# Patient Record
Sex: MALE | Race: WHITE
[De-identification: names, ages, dates, MRNs, and addresses within clinical notes are randomized per-mention and may not be internally consistent; named-entity substitution may affect disease eponyms.]

---

## 2020-12-23 NOTE — HISTORY AND PHYSICAL
DATE OF SERVICE:  



ADMISSION HISTORY AND PHYSICAL



This will be for outpatient surgery on 12/30/2020 for right shoulder

arthroscopic biceps tenodesis.



HISTORY OF PRESENT ILLNESS:

The patient is a 45-year-old right hand dominant gentleman, who previously

underwent a right shoulder SLAP repair, had been doing well until the summer. 

Began to experience right shoulder pain, worse with overhead activities and

worse with rolling.  It has progressed to the point where he is unable to

elevate his arm without assistance.  He underwent an MRI that showed a large

SLAP tear.  He denies neck pain, denies paresthesias.  Due to functional

impairment and failure to improve with extensive conservative measures, the

patient elected to proceed with surgical intervention.



REVIEW OF SYSTEMS:

No chest pain, no shortness of breath, no dysuria.



PAST MEDICAL HISTORY:

Unremarkable.



PAST SURGICAL HISTORY:

Right shoulder x2, cholecystectomy, tonsillectomy, EGD.



FAMILY HISTORY:

Significant for hyperlipidemia, diabetes, ischemic heart disease.



PRIMARY CARE PROVIDER:

Nurse practitioner, ____ Jordan.



MEDICATIONS:

Aspirin.



ALLERGIES:

No known drug allergies.



SOCIAL HISTORY:

The patient denies alcohol, tobacco use.



PHYSICAL EXAMINATION:

GENERAL:  The patient is well-developed, well-nourished, in no acute distress.

HEENT:  Normocephalic, atraumatic.  Pupils are equal, round, reactive to light. 

Oropharynx is clear.

NECK:  Supple, no lymphadenopathy.

LUNGS:  Clear to auscultation bilaterally.

HEART:  Regular rate and rhythm.

ABDOMEN:  Soft, nontender, nondistended.

EXTREMITIES:  Right shoulder demonstrates positive [place]. James's maneuver pain with

apprehension relieved with relocation.  Negative Spurling's.



Review of symmetric forward elevation, external and internal rotation.  Mildly

positive Neer's and King sign.  He has crepitus with glenohumeral rotation.



IMPRESSION:

Right shoulder SLAP tear.



PLAN:

Right shoulder arthroscopic-assisted biceps tenodesis.  We discussed risks,

benefits, options, ramifications and recovery.  He understands and wishes to

proceed.





Job ID: [id number]

DocumentID: [id number]

Dictated Date:  12/21/2020 15:21:45

Transcription Date: 12/21/2020 16:23:26

Dictated By: BRETT DAVALOS MD

## 2020-12-28 ENCOUNTER — HOSPITAL ENCOUNTER (OUTPATIENT)
Dept: HOSPITAL 75 - PREOP | Age: 45
Discharge: HOME | End: 2020-12-28
Attending: ORTHOPAEDIC SURGERY
Payer: COMMERCIAL

## 2020-12-28 VITALS — HEIGHT: 73.62 IN | WEIGHT: 210.32 LBS | BODY MASS INDEX: 27.28 KG/M2

## 2020-12-28 DIAGNOSIS — Z20.828: ICD-10-CM

## 2020-12-28 DIAGNOSIS — S43.431A: ICD-10-CM

## 2020-12-28 DIAGNOSIS — Z01.818: Primary | ICD-10-CM

## 2020-12-28 PROCEDURE — 87635 SARS-COV-2 COVID-19 AMP PRB: CPT

## 2020-12-30 ENCOUNTER — HOSPITAL ENCOUNTER (OUTPATIENT)
Dept: HOSPITAL 75 - SDC | Age: 45
End: 2020-12-30
Attending: ORTHOPAEDIC SURGERY
Payer: COMMERCIAL

## 2020-12-30 VITALS — SYSTOLIC BLOOD PRESSURE: 128 MMHG | DIASTOLIC BLOOD PRESSURE: 89 MMHG

## 2020-12-30 VITALS — SYSTOLIC BLOOD PRESSURE: 127 MMHG | DIASTOLIC BLOOD PRESSURE: 77 MMHG

## 2020-12-30 VITALS — BODY MASS INDEX: 27.28 KG/M2 | HEIGHT: 73.62 IN | WEIGHT: 210.32 LBS

## 2020-12-30 VITALS — SYSTOLIC BLOOD PRESSURE: 122 MMHG | DIASTOLIC BLOOD PRESSURE: 88 MMHG

## 2020-12-30 VITALS — SYSTOLIC BLOOD PRESSURE: 100 MMHG | DIASTOLIC BLOOD PRESSURE: 76 MMHG

## 2020-12-30 VITALS — SYSTOLIC BLOOD PRESSURE: 131 MMHG | DIASTOLIC BLOOD PRESSURE: 86 MMHG

## 2020-12-30 VITALS — DIASTOLIC BLOOD PRESSURE: 74 MMHG | SYSTOLIC BLOOD PRESSURE: 119 MMHG

## 2020-12-30 VITALS — DIASTOLIC BLOOD PRESSURE: 87 MMHG | SYSTOLIC BLOOD PRESSURE: 131 MMHG

## 2020-12-30 VITALS — DIASTOLIC BLOOD PRESSURE: 85 MMHG | SYSTOLIC BLOOD PRESSURE: 131 MMHG

## 2020-12-30 VITALS — DIASTOLIC BLOOD PRESSURE: 83 MMHG | SYSTOLIC BLOOD PRESSURE: 122 MMHG

## 2020-12-30 VITALS — DIASTOLIC BLOOD PRESSURE: 94 MMHG | SYSTOLIC BLOOD PRESSURE: 130 MMHG

## 2020-12-30 DIAGNOSIS — Z79.899: ICD-10-CM

## 2020-12-30 DIAGNOSIS — S43.431A: Primary | ICD-10-CM

## 2020-12-30 DIAGNOSIS — F41.9: ICD-10-CM

## 2020-12-30 DIAGNOSIS — S43.401A: ICD-10-CM

## 2020-12-30 DIAGNOSIS — F32.9: ICD-10-CM

## 2020-12-30 DIAGNOSIS — Z83.3: ICD-10-CM

## 2020-12-30 PROCEDURE — 87081 CULTURE SCREEN ONLY: CPT

## 2020-12-30 PROCEDURE — 29822 SHO ARTHRS SRG LMTD DBRDMT: CPT

## 2020-12-30 PROCEDURE — 23430 REPAIR BICEPS TENDON: CPT

## 2020-12-30 RX ADMIN — SODIUM CHLORIDE, SODIUM LACTATE, POTASSIUM CHLORIDE, AND CALCIUM CHLORIDE PRN MLS/HR: 600; 310; 30; 20 INJECTION, SOLUTION INTRAVENOUS at 09:55

## 2020-12-30 RX ADMIN — SODIUM CHLORIDE, SODIUM LACTATE, POTASSIUM CHLORIDE, AND CALCIUM CHLORIDE PRN MLS/HR: 600; 310; 30; 20 INJECTION, SOLUTION INTRAVENOUS at 07:20

## 2020-12-30 NOTE — OPERATIVE REPORT
DATE OF SERVICE:  12/30/2020



PREOPERATIVE DIAGNOSES:

1.  Right shoulder SLAP tear.

2.  Right shoulder labral tear.



POSTOPERATIVE DIAGNOSES:

1.  Right shoulder SLAP tear.

2.  Right shoulder labral tear.



PROCEDURES PERFORMED:

1.  Right shoulder open biceps tenodesis.

2.  Right shoulder arthroscopic labral debridement.



SURGEON:

Cesar Puente MD



ASSISTANT:

Dinesh Moss, who assisted throughout the procedure and closed the incisions.



ANESTHESIA:

General endotracheal by Jonathan Magallon CRNA.



ESTIMATED BLOOD LOSS:

Minimal.



DRAINS:

None.



COMPLICATIONS:

None.



POSTOPERATIVE PLAN:

Sling wear and passive range of motion for two weeks.  The patient was

transported to the recovery room awake and stable condition.



STATEMENT OF MEDICAL NECESSITY:

The patient is a 45-year-old right hand dominant gentleman, who previously

underwent arthroscopic SLAP repair, who had been doing well until this summer

when he began experiencing popping and catching in his shoulder pain with

overhead activities to the point where he was having difficulty with sleep and

MR arthrogram revealed a large SLAP tear.  Due to functional impairment and

failure to improve with conservative measures, the patient elected to proceed

with surgical intervention.  Examination under anesthesia revealed forward

elevation of 170 degrees, external rotation of 95 degrees and internal rotation

of 80 degrees.  Arthroscopic findings demonstrated a type 2 SLAP tear.  The

suture anchors were still intact, but loose.  The remaining of the labrum was

intact.  The humeral head and glenoid demonstrated no additional chondral

abnormalities.  The rotator cuff was intact throughout.  At the biceps anchor

site, there was a flap tear just posterior to the posterior anchor, but

otherwise, the remainder of the labrum was intact.



DESCRIPTION OF PROCEDURE:

After risks and benefits of the procedure were discussed and questions were

answered, informed consent was signed and placed on chart and the operative site

was confirmed in the preoperative holding area initialed by the surgeon.  The

patient was then transferred to the operating room.  After adequate levels of

general endotracheal anesthetic were obtained, a timeout was called, confirming

the operative site.  Examination under anesthesia was performed with the above

findings noted.  The right shoulder and upper extremity were prepped and draped

in the usual sterile fashion.  Shoulder joint was injected with 20 mL of fluid

and standard posterior portal was placed.  Under direct visualization, anterior

portal was created in the interval between biceps, subscapularis and glenoid. 

The biceps anchor was released, the stump was debrided with the shaver.  The

posterior labral flap was debrided with a shaver back to a stable edge.  The

sutures were debrided.  The shoulder joint was copiously irrigated and the

portal sites were closed with 4-0 nylon in a simple interrupted fashion.  An

incision was then made just distal to the pectoralis insertion on the upper

medial arm.  The underlying soft tissues were bluntly dissected.  The bicipital

groove was identified through dissection and the long head of the biceps was

pulled into the wound.  This was then whipstitched from the musculotendinous

junction 3 cm proximally.  The excessive tendon was excised.  A unicortical

drill hole was placed in the bicipital groove distal to the pectoralis

insertion.  The sutures were passed through the Arthrex biceps button, which was

then passed into the unicortical drill hole.  The button was flipped.  This

brought the tendon down to the bony surface and this was then oversewn on

itself.  The shoulder and elbow were taken through a range of motion and found

the repair was stable.  The wound was copiously irrigated.  The 3-0 Vicryl was

used to reapproximate subcutaneous tissue.  Skin was closed with 4-0 nylon in a

running alternating horizontal mattress fashion.  The shoulder joint was

injected with Duramorph.  The port sites were infiltrated with plain Marcaine as

was the incision site.  Soft dressing and sling were applied and the patient was

transported to the recovery room awake and in stable condition.





Job ID: [id number]

DocumentID: [id number]

Dictated Date:  12/30/2020 09:38:04

Transcription Date: 12/30/2020 11:30:34

Dictated By: CESAR PUENTE MD

## 2020-12-30 NOTE — ANESTHESIA-GENERAL POST-OP
General


Patient Condition


Mental Status/LOC:  Same as Preop


Cardiovascular:  Satisfactory


Nausea/Vomiting:  Absent


Respiratory:  Satisfactory


Pain:  Controlled


Complications:  Absent





Post Op Complications


Complications


None





Follow Up Care/Instructions


Patient Instructions


None needed.





Anesthesia/Patient Condition


Patient Condition


Patient is doing well, no complaints, stable vital signs, no apparent adverse 

anesthesia problems.   


No complications reported per nursing.











CHARLES CLEVELAND CRNA          Dec 30, 2020 10:58

## 2020-12-30 NOTE — PROGRESS NOTE-PRE OPERATIVE
Pre-Operative Progress Note


H&P Reviewed


The H&P was reviewed, patient examined and no changes noted.


Date Seen by Provider:  Dec 30, 2020


Time Seen by Provider:  07:38


Date H&P Reviewed:  Dec 30, 2020


Time H&P Reviewed:  07:11


Pre-Operative Diagnosis:  right SLAP tear











BRETT DAVALOS MD            Dec 30, 2020 07:39

## 2020-12-30 NOTE — PROGRESS NOTE-POST OPERATIVE
Post-Operative Progess Note


Surgeon (s)/Assistant (s)


Surgeon


BRETT DAVALOS MD


Assistant:  Dinesh Moss





Pre-Operative Diagnosis


right SLAP tear





Post-Operative Diagnosis





right SLAP tear and labral tear





Procedure & Operative Findings


Date of Procedure


12/30/20


Procedure Performed/Findings


right shoulder arthroscopic assisted biceps tenodesis and labral debridement


Anesthesia Type


GETA





Estimated Blood Loss


Estimated blood loss (mL):  minimal





Specimens/Packing


Specimens Removed


none


Packing:  


none











BRETT DAVALOS MD            Dec 30, 2020 07:39